# Patient Record
Sex: FEMALE | URBAN - METROPOLITAN AREA
[De-identification: names, ages, dates, MRNs, and addresses within clinical notes are randomized per-mention and may not be internally consistent; named-entity substitution may affect disease eponyms.]

---

## 2020-06-29 ENCOUNTER — OFFICE VISIT (OUTPATIENT)
Dept: URBAN - METROPOLITAN AREA CLINIC 113 | Facility: CLINIC | Age: 80
End: 2020-06-29

## 2020-07-25 ENCOUNTER — TELEPHONE ENCOUNTER (OUTPATIENT)
Dept: URBAN - METROPOLITAN AREA CLINIC 13 | Facility: CLINIC | Age: 80
End: 2020-07-25

## 2020-07-25 RX ORDER — PANTOPRAZOLE SODIUM 40 MG/1
TAKE ONE TABLET EVERY MORNING TABLET, DELAYED RELEASE ORAL
Qty: 90 | Refills: 3 | OUTPATIENT
Start: 2019-06-11 | End: 2020-05-27

## 2020-07-25 RX ORDER — SODIUM SULFATE, POTASSIUM SULFATE, MAGNESIUM SULFATE 17.5; 3.13; 1.6 G/ML; G/ML; G/ML
TAKE 1 BOTTLE AT 5:00PM THE DAY BEFORE PROCEDURE. TAKE 2ND BOTTLE 6 HRS PRIOR TO PROCEDURE SOLUTION, CONCENTRATE ORAL
Qty: 1 | Refills: 0 | OUTPATIENT
Start: 2019-08-14 | End: 2019-10-03

## 2020-07-25 RX ORDER — ONDANSETRON HYDROCHLORIDE 4 MG/1
TAKE 2 TABLET EVERY 6 HOURS TABLET, FILM COATED ORAL
Refills: 0 | OUTPATIENT
End: 2020-05-27

## 2020-07-25 RX ORDER — ALBUTEROL SULFATE 90 UG/1
INHALE 1 PUFF EVERY 4 HOURS AS NEEDED AEROSOL, METERED RESPIRATORY (INHALATION)
Refills: 0 | OUTPATIENT
Start: 2019-02-13 | End: 2020-05-26

## 2020-07-25 RX ORDER — PREGABALIN 75 MG/1
TAKE 1 CAPSULE TWICE DAILY CAPSULE ORAL
Refills: 0 | OUTPATIENT
Start: 2020-03-03 | End: 2020-05-27

## 2020-07-25 RX ORDER — FLUCONAZOLE 100 MG/1
TAKE ONE TABLET BY MOUTH DAILY TABLET ORAL
Qty: 7 | Refills: 0 | OUTPATIENT
Start: 2019-06-07 | End: 2019-10-25

## 2020-07-25 RX ORDER — OMEPRAZOLE 40 MG/1
TAKE 1 CAPSULE BY MOUTH TWICE DAILY CAPSULE, DELAYED RELEASE ORAL
Qty: 20 | Refills: 0 | OUTPATIENT
Start: 2019-06-18 | End: 2019-10-03

## 2020-07-25 RX ORDER — DIAZEPAM 5 MG/1
TAKE 1 TABLET  AS NEEDED TABLET ORAL
Refills: 0 | OUTPATIENT
Start: 2020-05-15 | End: 2020-05-27

## 2020-07-25 RX ORDER — BUDESONIDE AND FORMOTEROL FUMARATE DIHYDRATE 160; 4.5 UG/1; UG/1
INHALE 2 PUFFS TWICE DAILY. RINSE MOUTH AFTER USE PRN AEROSOL RESPIRATORY (INHALATION)
Refills: 0 | OUTPATIENT
Start: 2019-02-13 | End: 2020-05-26

## 2020-07-25 RX ORDER — OMEPRAZOLE 40 MG/1
TAKE 1 CAPSULE BY MOUTH ONCE DAILY CAPSULE, DELAYED RELEASE ORAL
Qty: 30 | Refills: 5 | OUTPATIENT
Start: 2019-10-25 | End: 2020-05-26

## 2020-07-25 RX ORDER — BISMUTH SUBCITRATE POTASSIUM, METRONIDAZOLE, TETRACYCLINE HYDROCHLORIDE 140; 125; 125 MG/1; MG/1; MG/1
TAKE 3 CAPSULE 4 TIMES DAILY CAPSULE ORAL
Qty: 120 | Refills: 0 | OUTPATIENT
Start: 2019-06-18 | End: 2019-07-05

## 2020-07-25 RX ORDER — ATORVASTATIN CALCIUM 10 MG/1
TAKE 1 TABLET DAILY TABLET, FILM COATED ORAL
Refills: 0 | OUTPATIENT
Start: 2019-12-18 | End: 2020-05-27

## 2020-07-25 RX ORDER — SUCRALFATE 1 G/10ML
TAKE 10 ML THREE TIMES A DAY SUSPENSION ORAL
Qty: 450 | Refills: 0 | OUTPATIENT
Start: 2019-07-11 | End: 2019-10-25

## 2020-07-25 RX ORDER — BISMUTH SUBCITRATE POTASSIUM, METRONIDAZOLE, TETRACYCLINE HYDROCHLORIDE 140; 125; 125 MG/1; MG/1; MG/1
TAKE 3 CAPSULE 4 TIMES DAILY CAPSULE ORAL
Qty: 168 | Refills: 0 | OUTPATIENT
Start: 2019-07-05 | End: 2019-10-03

## 2020-07-26 ENCOUNTER — TELEPHONE ENCOUNTER (OUTPATIENT)
Dept: URBAN - METROPOLITAN AREA CLINIC 13 | Facility: CLINIC | Age: 80
End: 2020-07-26

## 2020-07-26 RX ORDER — OXYCODONE AND ACETAMINOPHEN 10; 325 MG/1; MG/1
TAKE 1 TABLET EVERY 6 HOURS AS NEEDED TABLET ORAL
Refills: 0 | Status: ACTIVE | COMMUNITY
Start: 2020-03-03

## 2020-07-26 RX ORDER — OFLOXACIN 3 MG/ML
SOLUTION AURICULAR (OTIC)
Qty: 5 | Refills: 0 | Status: ACTIVE | COMMUNITY
Start: 2020-06-02

## 2020-07-26 RX ORDER — PREDNISONE 10 MG/1
TABLET ORAL
Qty: 66 | Refills: 0 | Status: ACTIVE | COMMUNITY
Start: 2019-02-27

## 2020-07-26 RX ORDER — HYDROCODONE BITARTRATE AND ACETAMINOPHEN 10; 325 MG/1; MG/1
TABLET ORAL
Qty: 42 | Refills: 0 | Status: ACTIVE | COMMUNITY
Start: 2019-04-05

## 2020-07-26 RX ORDER — AMOXICILLIN AND CLAVULANATE POTASSIUM 500; 125 MG/1; MG/1
TABLET, FILM COATED ORAL
Qty: 20 | Refills: 0 | Status: ACTIVE | COMMUNITY
Start: 2018-06-14

## 2020-07-26 RX ORDER — SUCRALFATE 1 G/10ML
SUSPENSION ORAL
Qty: 860 | Refills: 0 | Status: ACTIVE | COMMUNITY
Start: 2019-06-11

## 2020-07-26 RX ORDER — PREGABALIN 75 MG/1
CAPSULE ORAL
Qty: 360 | Refills: 0 | Status: ACTIVE | COMMUNITY
Start: 2019-10-09

## 2020-07-26 RX ORDER — HYDROXYCHLOROQUINE SULFATE 200 MG/1
TABLET, FILM COATED ORAL
Qty: 45 | Refills: 0 | Status: ACTIVE | COMMUNITY
Start: 2018-08-06

## 2020-07-26 RX ORDER — ALBUTEROL SULFATE 90 UG/1
AEROSOL, METERED RESPIRATORY (INHALATION)
Qty: 54 | Refills: 0 | Status: ACTIVE | COMMUNITY
Start: 2019-02-13

## 2020-07-26 RX ORDER — PANTOPRAZOLE SODIUM 40 MG/1
TABLET, DELAYED RELEASE ORAL
Qty: 15 | Refills: 0 | Status: ACTIVE | COMMUNITY
Start: 2018-10-18

## 2020-07-26 RX ORDER — HYDROCODONE BITARTRATE AND ACETAMINOPHEN 5; 325 MG/1; MG/1
TABLET ORAL
Qty: 88 | Refills: 0 | Status: ACTIVE | COMMUNITY
Start: 2019-04-17

## 2020-07-26 RX ORDER — OXYCODONE AND ACETAMINOPHEN 10; 325 MG/1; MG/1
TABLET ORAL
Qty: 90 | Refills: 0 | Status: ACTIVE | COMMUNITY
Start: 2020-01-17

## 2020-07-26 RX ORDER — FLUTICASONE PROPIONATE AND SALMETEROL 250; 50 UG/1; UG/1
POWDER RESPIRATORY (INHALATION)
Qty: 60 | Refills: 0 | Status: ACTIVE | COMMUNITY
Start: 2019-02-14

## 2020-07-26 RX ORDER — METHOCARBAMOL 500 MG/1
TABLET, FILM COATED ORAL
Qty: 56 | Refills: 0 | Status: ACTIVE | COMMUNITY
Start: 2020-06-22

## 2020-07-26 RX ORDER — FLUCONAZOLE 150 MG/1
TABLET ORAL
Qty: 14 | Refills: 0 | Status: ACTIVE | COMMUNITY
Start: 2019-06-11

## 2020-07-26 RX ORDER — PREDNISOLONE ACETATE 10 MG/ML
SUSPENSION/ DROPS OPHTHALMIC
Qty: 5 | Refills: 0 | Status: ACTIVE | COMMUNITY
Start: 2020-06-02

## 2020-07-26 RX ORDER — OXYCODONE AND ACETAMINOPHEN 5; 325 MG/1; MG/1
TABLET ORAL
Qty: 15 | Refills: 0 | Status: ACTIVE | COMMUNITY
Start: 2019-04-16

## 2020-07-26 RX ORDER — METHYLPREDNISOLONE 4 MG/1
TABLET ORAL
Qty: 21 | Refills: 0 | Status: ACTIVE | COMMUNITY
Start: 2019-02-01

## 2020-07-26 RX ORDER — AZITHROMYCIN DIHYDRATE 250 MG/1
TABLET, FILM COATED ORAL
Qty: 6 | Refills: 0 | Status: ACTIVE | COMMUNITY
Start: 2019-05-09

## 2020-07-26 RX ORDER — ONDANSETRON HYDROCHLORIDE 4 MG/1
TABLET, FILM COATED ORAL
Qty: 12 | Refills: 0 | Status: ACTIVE | COMMUNITY
Start: 2018-10-18

## 2020-07-26 RX ORDER — OXYCODONE AND ACETAMINOPHEN 10; 325 MG/1; MG/1
TABLET ORAL
Qty: 2 | Refills: 0 | Status: ACTIVE | COMMUNITY
Start: 2018-11-07

## 2020-07-26 RX ORDER — ATORVASTATIN CALCIUM 10 MG/1
TABLET, FILM COATED ORAL
Qty: 90 | Refills: 0 | Status: ACTIVE | COMMUNITY
Start: 2019-10-02

## 2020-07-26 RX ORDER — PROMETHAZINE HYDROCHLORIDE 25 MG/1
TABLET ORAL
Qty: 30 | Refills: 0 | Status: ACTIVE | COMMUNITY
Start: 2019-06-19

## 2020-07-26 RX ORDER — IBUPROFEN 800 MG/1
TABLET ORAL
Qty: 90 | Refills: 0 | Status: ACTIVE | COMMUNITY
Start: 2018-12-12

## 2020-07-26 RX ORDER — METHYLPREDNISOLONE 4 MG/1
TABLET ORAL
Qty: 21 | Refills: 0 | Status: ACTIVE | COMMUNITY
Start: 2019-01-08

## 2020-07-26 RX ORDER — PANTOPRAZOLE SODIUM 40 MG/1
TABLET, DELAYED RELEASE ORAL
Qty: 30 | Refills: 0 | Status: ACTIVE | COMMUNITY
Start: 2019-06-11

## 2020-07-26 RX ORDER — METHYLPREDNISOLONE 4 MG/1
TABLET ORAL
Qty: 21 | Refills: 0 | Status: ACTIVE | COMMUNITY
Start: 2018-06-14

## 2020-07-26 RX ORDER — ONDANSETRON HYDROCHLORIDE 4 MG/1
TABLET, FILM COATED ORAL
Qty: 20 | Refills: 0 | Status: ACTIVE | COMMUNITY
Start: 2019-04-16

## 2020-07-26 RX ORDER — ALBUTEROL SULFATE 90 UG/1
AEROSOL, METERED RESPIRATORY (INHALATION)
Qty: 18 | Refills: 0 | Status: ACTIVE | COMMUNITY
Start: 2019-02-13

## 2020-07-26 RX ORDER — HYDROXYCHLOROQUINE SULFATE 200 MG/1
TABLET, FILM COATED ORAL
Qty: 135 | Refills: 0 | Status: ACTIVE | COMMUNITY
Start: 2020-01-09

## 2020-07-26 RX ORDER — DIAZEPAM 10 MG/1
TABLET ORAL
Qty: 2 | Refills: 0 | Status: ACTIVE | COMMUNITY
Start: 2018-11-07

## 2020-07-26 RX ORDER — HYDROCODONE BITARTRATE AND ACETAMINOPHEN 5; 325 MG/1; MG/1
TABLET ORAL
Qty: 60 | Refills: 0 | Status: ACTIVE | COMMUNITY
Start: 2018-09-05

## 2020-07-26 RX ORDER — TRIAMTERENE AND HYDROCHLOROTHIAZIDE 37.5; 25 MG/1; MG/1
CAPSULE ORAL
Qty: 90 | Refills: 0 | Status: ACTIVE | COMMUNITY
Start: 2019-05-09

## 2020-07-26 RX ORDER — AZITHROMYCIN DIHYDRATE 250 MG/1
TABLET, FILM COATED ORAL
Qty: 6 | Refills: 0 | Status: ACTIVE | COMMUNITY
Start: 2019-01-08

## 2020-07-26 RX ORDER — HYDROCODONE BITARTRATE AND ACETAMINOPHEN 5; 325 MG/1; MG/1
TABLET ORAL
Qty: 36 | Refills: 0 | Status: ACTIVE | COMMUNITY
Start: 2019-05-21

## 2020-07-26 RX ORDER — NITROFURANTOIN MONOHYDRATE/MACROCRYSTALLINE 25; 75 MG/1; MG/1
CAPSULE ORAL
Qty: 6 | Refills: 0 | Status: ACTIVE | COMMUNITY
Start: 2019-06-11

## 2020-07-26 RX ORDER — CEPHALEXIN 500 MG/1
CAPSULE ORAL
Qty: 28 | Refills: 0 | Status: ACTIVE | COMMUNITY
Start: 2019-05-30

## 2020-07-26 RX ORDER — OXYCODONE AND ACETAMINOPHEN 5; 325 MG/1; MG/1
TABLET ORAL
Qty: 30 | Refills: 0 | Status: ACTIVE | COMMUNITY
Start: 2019-10-30

## 2020-07-26 RX ORDER — METHOCARBAMOL 500 MG/1
TABLET, FILM COATED ORAL
Qty: 56 | Refills: 0 | Status: ACTIVE | COMMUNITY
Start: 2019-04-05

## 2020-07-26 RX ORDER — PROMETHAZINE HYDROCHLORIDE 12.5 MG/1
TABLET ORAL
Qty: 15 | Refills: 0 | Status: ACTIVE | COMMUNITY
Start: 2019-06-11

## 2020-07-26 RX ORDER — DICLOFENAC SODIUM 10 MG/G
GEL TOPICAL
Qty: 600 | Refills: 0 | Status: ACTIVE | COMMUNITY
Start: 2019-06-25

## 2020-07-26 RX ORDER — HYDROXYCHLOROQUINE SULFATE 200 MG/1
TABLET, FILM COATED ORAL
Qty: 45 | Refills: 0 | Status: ACTIVE | COMMUNITY
Start: 2019-10-02

## 2020-07-26 RX ORDER — CIPROFLOXACIN HYDROCHLORIDE 250 MG/1
TABLET, FILM COATED ORAL
Qty: 10 | Refills: 0 | Status: ACTIVE | COMMUNITY
Start: 2019-02-18

## 2023-02-01 ENCOUNTER — WEB ENCOUNTER (OUTPATIENT)
Dept: URBAN - METROPOLITAN AREA CLINIC 113 | Facility: CLINIC | Age: 83
End: 2023-02-01

## 2023-02-01 ENCOUNTER — OFFICE VISIT (OUTPATIENT)
Dept: URBAN - METROPOLITAN AREA CLINIC 113 | Facility: CLINIC | Age: 83
End: 2023-02-01
Payer: MEDICARE

## 2023-02-01 VITALS
BODY MASS INDEX: 18.33 KG/M2 | HEART RATE: 79 BPM | HEIGHT: 65 IN | WEIGHT: 110 LBS | DIASTOLIC BLOOD PRESSURE: 75 MMHG | TEMPERATURE: 97.5 F | SYSTOLIC BLOOD PRESSURE: 131 MMHG | RESPIRATION RATE: 22 BRPM

## 2023-02-01 DIAGNOSIS — K40.91 UNILATERAL RECURRENT INGUINAL HERNIA WITHOUT OBSTRUCTION OR GANGRENE: ICD-10-CM

## 2023-02-01 DIAGNOSIS — K25.7 CHRONIC GASTRIC ULCER WITHOUT HEMORRHAGE AND WITHOUT PERFORATION: ICD-10-CM

## 2023-02-01 DIAGNOSIS — K22.2 ESOPHAGEAL STENOSIS: ICD-10-CM

## 2023-02-01 DIAGNOSIS — R13.19 ESOPHAGEAL DYSPHAGIA: ICD-10-CM

## 2023-02-01 PROBLEM — 76796008: Status: ACTIVE | Noted: 2023-02-01

## 2023-02-01 PROBLEM — 300286002: Status: ACTIVE | Noted: 2023-02-01

## 2023-02-01 PROCEDURE — 99214 OFFICE O/P EST MOD 30 MIN: CPT | Performed by: INTERNAL MEDICINE

## 2023-02-01 RX ORDER — AZITHROMYCIN DIHYDRATE 250 MG/1
TABLET, FILM COATED ORAL
Qty: 6 | Refills: 0 | Status: DISCONTINUED | COMMUNITY
Start: 2019-01-08

## 2023-02-01 RX ORDER — PREDNISONE 10 MG/1
TABLET ORAL
Qty: 66 | Refills: 0 | Status: DISCONTINUED | COMMUNITY
Start: 2019-02-27

## 2023-02-01 RX ORDER — FLUTICASONE PROPIONATE AND SALMETEROL 250; 50 UG/1; UG/1
POWDER RESPIRATORY (INHALATION)
Qty: 60 | Refills: 0 | Status: DISCONTINUED | COMMUNITY
Start: 2019-02-14

## 2023-02-01 RX ORDER — TRIAMTERENE AND HYDROCHLOROTHIAZIDE 37.5; 25 MG/1; MG/1
CAPSULE ORAL
Qty: 90 | Refills: 0 | Status: DISCONTINUED | COMMUNITY
Start: 2019-05-09

## 2023-02-01 RX ORDER — OFLOXACIN 3 MG/ML
SOLUTION AURICULAR (OTIC)
Qty: 5 | Refills: 0 | Status: DISCONTINUED | COMMUNITY
Start: 2020-06-02

## 2023-02-01 RX ORDER — CIPROFLOXACIN HYDROCHLORIDE 250 MG/1
TABLET, FILM COATED ORAL
Qty: 10 | Refills: 0 | Status: DISCONTINUED | COMMUNITY
Start: 2019-02-18

## 2023-02-01 RX ORDER — OXYCODONE AND ACETAMINOPHEN 10; 325 MG/1; MG/1
TABLET ORAL
Qty: 2 | Refills: 0 | Status: DISCONTINUED | COMMUNITY
Start: 2018-11-07

## 2023-02-01 RX ORDER — METHOCARBAMOL 500 MG/1
TABLET, FILM COATED ORAL
Qty: 56 | Refills: 0 | Status: DISCONTINUED | COMMUNITY
Start: 2019-04-05

## 2023-02-01 RX ORDER — PANTOPRAZOLE SODIUM 40 MG/1
TABLET, DELAYED RELEASE ORAL
Qty: 15 | Refills: 0 | Status: DISCONTINUED | COMMUNITY
Start: 2018-10-18

## 2023-02-01 RX ORDER — ALBUTEROL SULFATE 90 UG/1
AEROSOL, METERED RESPIRATORY (INHALATION)
Qty: 18 | Refills: 0 | Status: DISCONTINUED | COMMUNITY
Start: 2019-02-13

## 2023-02-01 RX ORDER — PREGABALIN 75 MG/1
CAPSULE ORAL
Qty: 360 | Refills: 0 | Status: DISCONTINUED | COMMUNITY
Start: 2019-10-09

## 2023-02-01 RX ORDER — NITROFURANTOIN MONOHYDRATE/MACROCRYSTALLINE 25; 75 MG/1; MG/1
CAPSULE ORAL
Qty: 6 | Refills: 0 | Status: DISCONTINUED | COMMUNITY
Start: 2019-06-11

## 2023-02-01 RX ORDER — ATORVASTATIN CALCIUM 10 MG/1
TABLET, FILM COATED ORAL
Qty: 90 | Refills: 0 | Status: ACTIVE | COMMUNITY
Start: 2019-10-02

## 2023-02-01 RX ORDER — FLUCONAZOLE 150 MG/1
TABLET ORAL
Qty: 14 | Refills: 0 | Status: DISCONTINUED | COMMUNITY
Start: 2019-06-11

## 2023-02-01 RX ORDER — HYDROXYCHLOROQUINE SULFATE 200 MG/1
TABLET, FILM COATED ORAL
Qty: 45 | Refills: 0 | Status: DISCONTINUED | COMMUNITY
Start: 2018-08-06

## 2023-02-01 RX ORDER — PROMETHAZINE HYDROCHLORIDE 12.5 MG/1
TABLET ORAL
Qty: 15 | Refills: 0 | Status: DISCONTINUED | COMMUNITY
Start: 2019-06-11

## 2023-02-01 RX ORDER — HYDROCODONE BITARTRATE AND ACETAMINOPHEN 5; 325 MG/1; MG/1
TABLET ORAL
Qty: 60 | Refills: 0 | Status: DISCONTINUED | COMMUNITY
Start: 2018-09-05

## 2023-02-01 RX ORDER — DIAZEPAM 10 MG/1
TABLET ORAL
Qty: 2 | Refills: 0 | Status: DISCONTINUED | COMMUNITY
Start: 2018-11-07

## 2023-02-01 RX ORDER — ONDANSETRON HYDROCHLORIDE 4 MG/1
TABLET, FILM COATED ORAL
Qty: 12 | Refills: 0 | Status: DISCONTINUED | COMMUNITY
Start: 2018-10-18

## 2023-02-01 RX ORDER — DICLOFENAC SODIUM 10 MG/G
GEL TOPICAL
Qty: 600 | Refills: 0 | Status: DISCONTINUED | COMMUNITY
Start: 2019-06-25

## 2023-02-01 RX ORDER — ALBUTEROL SULFATE 90 UG/1
AEROSOL, METERED RESPIRATORY (INHALATION)
Qty: 54 | Refills: 0 | Status: DISCONTINUED | COMMUNITY
Start: 2019-02-13

## 2023-02-01 RX ORDER — CEPHALEXIN 500 MG/1
CAPSULE ORAL
Qty: 28 | Refills: 0 | Status: DISCONTINUED | COMMUNITY
Start: 2019-05-30

## 2023-02-01 RX ORDER — AMOXICILLIN AND CLAVULANATE POTASSIUM 500; 125 MG/1; MG/1
TABLET, FILM COATED ORAL
Qty: 20 | Refills: 0 | Status: DISCONTINUED | COMMUNITY
Start: 2018-06-14

## 2023-02-01 RX ORDER — IBUPROFEN 800 MG/1
TABLET ORAL
Qty: 90 | Refills: 0 | Status: DISCONTINUED | COMMUNITY
Start: 2018-12-12

## 2023-02-01 RX ORDER — PREDNISOLONE ACETATE 10 MG/ML
SUSPENSION/ DROPS OPHTHALMIC
Qty: 5 | Refills: 0 | Status: DISCONTINUED | COMMUNITY
Start: 2020-06-02

## 2023-02-01 RX ORDER — OXYCODONE AND ACETAMINOPHEN 5; 325 MG/1; MG/1
TABLET ORAL
Qty: 15 | Refills: 0 | Status: DISCONTINUED | COMMUNITY
Start: 2019-04-16

## 2023-02-01 RX ORDER — SUCRALFATE 1 G/10ML
SUSPENSION ORAL
Qty: 860 | Refills: 0 | Status: DISCONTINUED | COMMUNITY
Start: 2019-06-11

## 2023-02-01 RX ORDER — PROMETHAZINE HYDROCHLORIDE 25 MG/1
TABLET ORAL
Qty: 30 | Refills: 0 | Status: DISCONTINUED | COMMUNITY
Start: 2019-06-19

## 2023-02-01 RX ORDER — HYDROCODONE BITARTRATE AND ACETAMINOPHEN 10; 325 MG/1; MG/1
TABLET ORAL
Qty: 42 | Refills: 0 | Status: DISCONTINUED | COMMUNITY
Start: 2019-04-05

## 2023-02-01 RX ORDER — METHYLPREDNISOLONE 4 MG/1
TABLET ORAL
Qty: 21 | Refills: 0 | Status: DISCONTINUED | COMMUNITY
Start: 2018-06-14

## 2023-02-01 NOTE — HPI-TODAY'S VISIT:
81 y/o female with a hx of esophageal dysphagia and esophageal dilation here with dysphagia and abdominal pain. She last had an EGD in ay 2020. She was dilated up to 20mm. She also had gastric ulcers x 3.   She has had decreased appetite, food sticking, nausea, and 30 lb weight loss over the past year.

## 2023-03-31 ENCOUNTER — CLAIMS CREATED FROM THE CLAIM WINDOW (OUTPATIENT)
Dept: URBAN - METROPOLITAN AREA CLINIC 4 | Facility: CLINIC | Age: 83
End: 2023-03-31
Payer: MEDICARE

## 2023-03-31 ENCOUNTER — OFFICE VISIT (OUTPATIENT)
Dept: URBAN - METROPOLITAN AREA SURGERY CENTER 25 | Facility: SURGERY CENTER | Age: 83
End: 2023-03-31
Payer: MEDICARE

## 2023-03-31 DIAGNOSIS — K29.70 GASTRITIS, UNSPECIFIED, WITHOUT BLEEDING: ICD-10-CM

## 2023-03-31 DIAGNOSIS — K31.89 REACTIVE GASTROPATHY: ICD-10-CM

## 2023-03-31 DIAGNOSIS — R13.10 DYSPHAGIA: ICD-10-CM

## 2023-03-31 PROCEDURE — 88312 SPECIAL STAINS GROUP 1: CPT | Performed by: PATHOLOGY

## 2023-03-31 PROCEDURE — 88305 TISSUE EXAM BY PATHOLOGIST: CPT | Performed by: PATHOLOGY

## 2023-03-31 PROCEDURE — G8907 PT DOC NO EVENTS ON DISCHARG: HCPCS | Performed by: INTERNAL MEDICINE

## 2023-03-31 PROCEDURE — 43248 EGD GUIDE WIRE INSERTION: CPT | Performed by: INTERNAL MEDICINE

## 2023-03-31 PROCEDURE — 43239 EGD BIOPSY SINGLE/MULTIPLE: CPT | Performed by: INTERNAL MEDICINE

## 2023-03-31 RX ORDER — ATORVASTATIN CALCIUM 10 MG/1
TABLET, FILM COATED ORAL
Qty: 90 | Refills: 0 | Status: ACTIVE | COMMUNITY
Start: 2019-10-02

## 2023-04-13 ENCOUNTER — OFFICE VISIT (OUTPATIENT)
Dept: URBAN - METROPOLITAN AREA CLINIC 113 | Facility: CLINIC | Age: 83
End: 2023-04-13
Payer: MEDICARE

## 2023-04-13 VITALS
HEART RATE: 66 BPM | WEIGHT: 109.6 LBS | HEIGHT: 65 IN | DIASTOLIC BLOOD PRESSURE: 60 MMHG | SYSTOLIC BLOOD PRESSURE: 132 MMHG | BODY MASS INDEX: 18.26 KG/M2 | TEMPERATURE: 97.5 F | RESPIRATION RATE: 18 BRPM

## 2023-04-13 DIAGNOSIS — R11.0 NAUSEA: ICD-10-CM

## 2023-04-13 DIAGNOSIS — R63.0 DECREASED APPETITE: ICD-10-CM

## 2023-04-13 DIAGNOSIS — K76.89 LIVER CYST: ICD-10-CM

## 2023-04-13 DIAGNOSIS — K25.7 CHRONIC GASTRIC ULCER WITHOUT HEMORRHAGE AND WITHOUT PERFORATION: ICD-10-CM

## 2023-04-13 DIAGNOSIS — K22.2 ESOPHAGEAL STENOSIS: ICD-10-CM

## 2023-04-13 DIAGNOSIS — R13.12 OROPHARYNGEAL DYSPHAGIA: ICD-10-CM

## 2023-04-13 DIAGNOSIS — Z86.010 HISTORY OF COLON POLYPS: ICD-10-CM

## 2023-04-13 DIAGNOSIS — K40.91 UNILATERAL RECURRENT INGUINAL HERNIA WITHOUT OBSTRUCTION OR GANGRENE: ICD-10-CM

## 2023-04-13 DIAGNOSIS — R63.4 WEIGHT LOSS: ICD-10-CM

## 2023-04-13 PROBLEM — 71457002: Status: ACTIVE | Noted: 2023-04-13

## 2023-04-13 PROBLEM — 428283002: Status: ACTIVE | Noted: 2023-04-13

## 2023-04-13 PROBLEM — 64379006: Status: ACTIVE | Noted: 2023-04-13

## 2023-04-13 PROBLEM — 85057007: Status: ACTIVE | Noted: 2023-04-13

## 2023-04-13 PROCEDURE — 99214 OFFICE O/P EST MOD 30 MIN: CPT

## 2023-04-13 RX ORDER — ONDANSETRON 4 MG/1
1 TABLET ON THE TONGUE AND ALLOW TO DISSOLVE TABLET, ORALLY DISINTEGRATING ORAL
Qty: 40 | Refills: 0 | OUTPATIENT
Start: 2023-04-13

## 2023-04-13 RX ORDER — ATORVASTATIN CALCIUM 10 MG/1
TABLET, FILM COATED ORAL
Qty: 90 | Refills: 0 | Status: ON HOLD | COMMUNITY
Start: 2019-10-02

## 2023-04-13 RX ORDER — OXYCODONE AND ACETAMINOPHEN 5; 325 MG/1; MG/1
1 TABLET AS NEEDED TABLET ORAL
Refills: 0 | Status: ACTIVE | COMMUNITY
Start: 2019-04-16

## 2023-04-13 NOTE — HPI-TODAY'S VISIT:
82-year-old female presents for follow-up after EGD.  She was last seen on 2/1/2023.  Given her dysphagia she was planned for EGD with dilation if indicated.  She has a history of unilateral recurrent inguinal hernia.  She was planned for CT scan of the abdomen/pelvis for further evaluation. CT scan of the abdomen/pelvis with contrast 3/8/2023:No discrete recurrent inguinal hernia is seen.  There is an 18 mm left hepatic dome cyst, similar to prior study.  Spleen, adrenal glands, pancreas are normal.  Cholecystectomy. EGD 3/31/2023:Performed without difficulty.  No endoscopic abnormality was evident in the esophagus to explain patient's dysphagia.  Despite this the entire esophagus was dilated with a Savary dilator to 18 mm.  Gastritis noted and biopsied. This revealed chemical/reactive gastropathy, negative for H pylori. Normal duodenum.  Repeat EGD as needed.   She has had memory issues of late. her  has to aid in providing a history. She describes an oropharyngeal dysphagia and globus sensation in her upper esophgus. She has occasional nausea with vomiting. She has an anti-emetic at home that works well for this. She states "she never has an appetite." She was unaware of any prior liver cyst. She feels a bulge in her left inguinal area. This only bothers her when doing heavy lifting.  2/1/2023 83 y/o female with a hx of esophageal dysphagia and esophageal dilation here with dysphagia and abdominal pain. She last had an EGD in ay 2020. She was dilated up to 20mm. She also had gastric ulcers x 3.   She has had decreased appetite, food sticking, nausea, and 30 lb weight loss over the past year.

## 2023-06-14 ENCOUNTER — OFFICE VISIT (OUTPATIENT)
Dept: URBAN - METROPOLITAN AREA CLINIC 113 | Facility: CLINIC | Age: 83
End: 2023-06-14

## 2023-06-14 NOTE — HPI-TODAY'S VISIT:
82-year-old female presents for follow-up.  She was last seen on 4/13/2023.  She has had oropharyngeal dysphagia and globus sensation with a history of esophageal stricture.  EGD revealed no esophageal abnormality however the entire esophagus was dilated.  She had mild improvement in her swallowing.  Barium swallow in 2019 did reveal mild dysmotility and suspected this may be her issue.  We discussed repeating the barium swallow though she declined.  She was recommended a trial of over-the-counter Pepcid for 14 days.  She was provided Zofran for her chronic intermittent nausea.  She did have evidence of the liver cyst on CT imaging.  This is likely benign however given lack of prior imaging to compare and ongoing weight loss she is planning for MRI of the abdomen for further evaluation.  We do not have MRI for review.  4/13/2023 82-year-old female presents for follow-up after EGD.  She was last seen on 2/1/2023.  Given her dysphagia she was planned for EGD with dilation if indicated.  She has a history of unilateral recurrent inguinal hernia.  She was planned for CT scan of the abdomen/pelvis for further evaluation. CT scan of the abdomen/pelvis with contrast 3/8/2023:No discrete recurrent inguinal hernia is seen.  There is an 18 mm left hepatic dome cyst, similar to prior study.  Spleen, adrenal glands, pancreas are normal.  Cholecystectomy. EGD 3/31/2023:Performed without difficulty.  No endoscopic abnormality was evident in the esophagus to explain patient's dysphagia.  Despite this the entire esophagus was dilated with a Savary dilator to 18 mm.  Gastritis noted and biopsied. This revealed chemical/reactive gastropathy, negative for H pylori. Normal duodenum.  Repeat EGD as needed.   She has had memory issues of late. her  has to aid in providing a history. She describes an oropharyngeal dysphagia and globus sensation in her upper esophgus. She has occasional nausea with vomiting. She has an anti-emetic at home that works well for this. She states "she never has an appetite." She was unaware of any prior liver cyst. She feels a bulge in her left inguinal area. This only bothers her when doing heavy lifting.  2/1/2023 81 y/o female with a hx of esophageal dysphagia and esophageal dilation here with dysphagia and abdominal pain. She last had an EGD in ay 2020. She was dilated up to 20mm. She also had gastric ulcers x 3.   She has had decreased appetite, food sticking, nausea, and 30 lb weight loss over the past year.

## 2023-06-16 ENCOUNTER — TELEPHONE ENCOUNTER (OUTPATIENT)
Dept: URBAN - METROPOLITAN AREA CLINIC 113 | Facility: CLINIC | Age: 83
End: 2023-06-16

## 2023-06-16 RX ORDER — ONDANSETRON 4 MG/1
1 TABLET ON THE TONGUE AND ALLOW TO DISSOLVE TABLET, ORALLY DISINTEGRATING ORAL
Qty: 90 | Refills: 0 | OUTPATIENT
Start: 2023-06-16

## 2023-08-09 ENCOUNTER — OFFICE VISIT (OUTPATIENT)
Dept: URBAN - METROPOLITAN AREA CLINIC 113 | Facility: CLINIC | Age: 83
End: 2023-08-09
Payer: MEDICARE

## 2023-08-09 ENCOUNTER — DASHBOARD ENCOUNTERS (OUTPATIENT)
Age: 83
End: 2023-08-09

## 2023-08-09 VITALS
DIASTOLIC BLOOD PRESSURE: 71 MMHG | BODY MASS INDEX: 18.69 KG/M2 | WEIGHT: 112.2 LBS | HEART RATE: 80 BPM | RESPIRATION RATE: 18 BRPM | HEIGHT: 65 IN | TEMPERATURE: 97.3 F | SYSTOLIC BLOOD PRESSURE: 131 MMHG

## 2023-08-09 DIAGNOSIS — K22.89 PRESBYESOPHAGUS: ICD-10-CM

## 2023-08-09 DIAGNOSIS — R63.0 DECREASED APPETITE: ICD-10-CM

## 2023-08-09 DIAGNOSIS — K25.7 CHRONIC GASTRIC ULCER WITHOUT HEMORRHAGE AND WITHOUT PERFORATION: ICD-10-CM

## 2023-08-09 DIAGNOSIS — K22.2 ESOPHAGEAL STENOSIS: ICD-10-CM

## 2023-08-09 DIAGNOSIS — Z86.010 HISTORY OF COLON POLYPS: ICD-10-CM

## 2023-08-09 DIAGNOSIS — K40.91 UNILATERAL RECURRENT INGUINAL HERNIA WITHOUT OBSTRUCTION OR GANGRENE: ICD-10-CM

## 2023-08-09 DIAGNOSIS — R63.4 WEIGHT LOSS: ICD-10-CM

## 2023-08-09 DIAGNOSIS — R13.12 OROPHARYNGEAL DYSPHAGIA: ICD-10-CM

## 2023-08-09 DIAGNOSIS — K76.89 LIVER CYST: ICD-10-CM

## 2023-08-09 DIAGNOSIS — R11.0 NAUSEA: ICD-10-CM

## 2023-08-09 PROCEDURE — 99214 OFFICE O/P EST MOD 30 MIN: CPT

## 2023-08-09 RX ORDER — ONDANSETRON 4 MG/1
1 TABLET ON THE TONGUE AND ALLOW TO DISSOLVE TABLET, ORALLY DISINTEGRATING ORAL
Qty: 40 | Refills: 0 | OUTPATIENT

## 2023-08-09 RX ORDER — MIRTAZAPINE 15 MG/1
1 TABLET AT BEDTIME TABLET, FILM COATED ORAL ONCE A DAY
Qty: 90 TABLET | Refills: 1 | OUTPATIENT
Start: 2023-08-09

## 2023-08-09 RX ORDER — OXYCODONE AND ACETAMINOPHEN 5; 325 MG/1; MG/1
1 TABLET AS NEEDED TABLET ORAL
Refills: 0 | Status: ACTIVE | COMMUNITY
Start: 2019-04-16

## 2023-08-09 RX ORDER — ATORVASTATIN CALCIUM 10 MG/1
TABLET, FILM COATED ORAL
Qty: 90 | Refills: 0 | Status: ON HOLD | COMMUNITY
Start: 2019-10-02

## 2023-08-09 RX ORDER — ONDANSETRON 4 MG/1
1 TABLET ON THE TONGUE AND ALLOW TO DISSOLVE TABLET, ORALLY DISINTEGRATING ORAL
Qty: 90 | Refills: 0 | Status: ACTIVE | COMMUNITY
Start: 2023-06-16

## 2023-08-09 RX ORDER — OMEPRAZOLE 20 MG/1
1 CAPSULE 30 MINUTES BEFORE MORNING MEAL CAPSULE, DELAYED RELEASE ORAL ONCE A DAY
Qty: 90 | Refills: 3 | OUTPATIENT
Start: 2023-08-09

## 2023-08-09 RX ORDER — ONDANSETRON 4 MG/1
1 TABLET ON THE TONGUE AND ALLOW TO DISSOLVE TABLET, ORALLY DISINTEGRATING ORAL
Qty: 40 | Refills: 0 | Status: ON HOLD | COMMUNITY
Start: 2023-04-13

## 2023-08-09 NOTE — HPI-TODAY'S VISIT:
82-year-old female presents for follow-up.  She was last seen on 4/13/2023.  She has had oropharyngeal dysphagia and globus sensation with a history of esophageal stricture.  EGD revealed no esophageal abnormality however the entire esophagus was dilated.  She had mild improvement in her swallowing.  Barium swallow in 2019 did reveal mild dysmotility and suspected this maybe her issue.  We discussed repeating the barium swallow though she declined.  She was recommended a trial of over-the-counter Pepcid for 14 days.  She was provided Zofran for her chronic intermittent nausea.  She did have evidence of the liver cyst on CT imaging.  This is likely benign however given lack of prior imaging to compare and ongoing weight loss she is planning for MRI of the abdomen for further evaluation.    MRI of the abdomen with and without contrast/28/23:Liver is normal with normal background signal.  Nonenhancing 2 cm cyst within left hepatic lobe, similar to prior.  No suspicious osseous lesions.  Postoperative changes of the lumbar spine and left pelvis.  Associated 4.8 x 1.3 x 7.2 cm nonenhancing postoperative fluid collection potentially representing chronic seroma or hematoma.  Bilateral renal cyst.  was informed of results. Barium swallow 6/27/2023:Tablet delayed at the GE junction and passed into the stomach.  There are tertiary contractions in the esophagus consistent with presbyesophagus..  No mucosal thickening or esophageal stricture.  Small hiatal hernia.  No gastroesophageal reflux. Upper GI series performed for epigastric pain with nausea 7/11/2023:We do not have report.    She was recommended to see Dr. Wu for her back pain. She has not hear back from their office. She continues to have dysphagia, nausea and throat clearing. She is on chronic opioids for leg pain. She has no appetite. She eats maybe one meal per day. Her weight has interestingly improved by 3 pounds.  4/13/2023 82-year-old female presents for follow-up after EGD.  She was last seen on 2/1/2023.  Given her dysphagia she was planned for EGD with dilation if indicated.  She has a history of unilateral recurrent inguinal hernia.  She was planned for CT scan of the abdomen/pelvis for further evaluation. CT scan of the abdomen/pelvis with contrast 3/8/2023:No discrete recurrent inguinal hernia is seen.  There is an 18 mm left hepatic dome cyst, similar to prior study.  Spleen, adrenal glands, pancreas are normal.  Cholecystectomy. EGD 3/31/2023:Performed without difficulty.  No endoscopic abnormality was evident in the esophagus to explain patient's dysphagia.  Despite this the entire esophagus was dilated with a Savary dilator to 18 mm.  Gastritis noted and biopsied. This revealed chemical/reactive gastropathy, negative for H pylori. Normal duodenum.  Repeat EGD as needed.   She has had memory issues of late. Her  has to aid in providing a history. She describes an oropharyngeal dysphagia and globus sensation in her upper esophagus. She has occasional nausea with vomiting. She has an anti-emetic at home that works well for this. She states "she never has an appetite." She was unaware of any prior liver cyst. She feels a bulge in her left inguinal area. This only bothers her when doing heavy lifting.  2/1/2023 83 y/o female with a hx of esophageal dysphagia and esophageal dilation here with dysphagia and abdominal pain. She last had an EGD in ay 2020. She was dilated up to 20mm. She also had gastric ulcers x 3.   She has had decreased appetite, food sticking, nausea, and 30 lb weight loss over the past year.

## 2023-08-10 LAB
A/G RATIO: 1.9
ABSOLUTE BASOPHILS: 32
ABSOLUTE EOSINOPHILS: 465
ABSOLUTE LYMPHOCYTES: 1808
ABSOLUTE MONOCYTES: 524
ABSOLUTE NEUTROPHILS: 1771
ALBUMIN: 4.1
ALKALINE PHOSPHATASE: 95
ALT (SGPT): 6
AST (SGOT): 15
BASOPHILS: 0.7
BILIRUBIN, TOTAL: 0.4
BUN/CREATININE RATIO: (no result)
BUN: 18
C-REACTIVE PROTEIN, QUANT: 0.6
CALCIUM: 9.6
CARBON DIOXIDE, TOTAL: 29
CHLORIDE: 102
CREATININE: 0.7
EGFR: 85
EOSINOPHILS: 10.1
GLOBULIN, TOTAL: 2.2
GLUCOSE: 103
HEMATOCRIT: 40.5
HEMOGLOBIN: 13.7
LYMPHOCYTES: 39.3
MCH: 34.1
MCHC: 33.8
MCV: 100.7
MONOCYTES: 11.4
MPV: 10.3
NEUTROPHILS: 38.5
PLATELET COUNT: 258
POTASSIUM: 4.3
PROTEIN, TOTAL: 6.3
RDW: 11.7
RED BLOOD CELL COUNT: 4.02
SED RATE BY MODIFIED: 2
SODIUM: 141
TSH W/REFLEX TO FT4: 2.23
WHITE BLOOD CELL COUNT: 4.6

## 2023-08-11 ENCOUNTER — TELEPHONE ENCOUNTER (OUTPATIENT)
Dept: URBAN - METROPOLITAN AREA CLINIC 113 | Facility: CLINIC | Age: 83
End: 2023-08-11

## 2023-11-09 ENCOUNTER — OFFICE VISIT (OUTPATIENT)
Dept: URBAN - METROPOLITAN AREA CLINIC 113 | Facility: CLINIC | Age: 83
End: 2023-11-09

## 2023-11-09 RX ORDER — ONDANSETRON 4 MG/1
1 TABLET ON THE TONGUE AND ALLOW TO DISSOLVE TABLET, ORALLY DISINTEGRATING ORAL
Qty: 40 | Refills: 0 | Status: ACTIVE | COMMUNITY

## 2023-11-09 RX ORDER — MIRTAZAPINE 15 MG/1
1 TABLET AT BEDTIME TABLET, FILM COATED ORAL ONCE A DAY
Qty: 90 TABLET | Refills: 1 | Status: ACTIVE | COMMUNITY
Start: 2023-08-09

## 2023-11-09 RX ORDER — OMEPRAZOLE 20 MG/1
1 CAPSULE 30 MINUTES BEFORE MORNING MEAL CAPSULE, DELAYED RELEASE ORAL ONCE A DAY
Qty: 90 | Refills: 3 | Status: ACTIVE | COMMUNITY
Start: 2023-08-09

## 2023-11-09 RX ORDER — ONDANSETRON 4 MG/1
1 TABLET ON THE TONGUE AND ALLOW TO DISSOLVE TABLET, ORALLY DISINTEGRATING ORAL
Qty: 90 | Refills: 0 | Status: ACTIVE | COMMUNITY
Start: 2023-06-16

## 2023-11-09 RX ORDER — ATORVASTATIN CALCIUM 10 MG/1
TABLET, FILM COATED ORAL
Qty: 90 | Refills: 0 | Status: ON HOLD | COMMUNITY
Start: 2019-10-02

## 2023-11-09 RX ORDER — OXYCODONE AND ACETAMINOPHEN 5; 325 MG/1; MG/1
1 TABLET AS NEEDED TABLET ORAL
Refills: 0 | Status: ACTIVE | COMMUNITY
Start: 2019-04-16

## 2023-11-09 NOTE — HPI-TODAY'S VISIT:
83-year-old female presents for follow-up.  She was last seen on 8/9/2023.  Swallowing precautions reviewed.  She was started on low-dose PPI therapy in the interim to treat any underlying reflux that may be contributing to her throat clearing.  Regarding her continued weight loss, additional labs were obtained and she was to try Remeron for appetite stimulants.  Recommended Ensure shakes 1-2 times daily for increased caloric intake. Labs 8/9/2023:Normal sed rate, normal CRP, normal TSH, unremarkable CMP, unremarkable CBC. A gastric emptying scan was ordered in August and she was again advised to start her Remeron. We do not have the scan for review. 8/9/2023: 82-year-old female presents for follow-up.  She was last seen on 4/13/2023.  She has had oropharyngeal dysphagia and globus sensation with a history of esophageal stricture.  EGD revealed no esophageal abnormality however the entire esophagus was dilated.  She had mild improvement in her swallowing.  Barium swallow in 2019 did reveal mild dysmotility and suspected this maybe her issue.  We discussed repeating the barium swallow though she declined.  She was recommended a trial of over-the-counter Pepcid for 14 days.  She was provided Zofran for her chronic intermittent nausea.  She did have evidence of the liver cyst on CT imaging.  This is likely benign however given lack of prior imaging to compare and ongoing weight loss she is planning for MRI of the abdomen for further evaluation.    MRI of the abdomen with and without contrast/28/23:Liver is normal with normal background signal.  Nonenhancing 2 cm cyst within left hepatic lobe, similar to prior.  No suspicious osseous lesions.  Postoperative changes of the lumbar spine and left pelvis.  Associated 4.8 x 1.3 x 7.2 cm nonenhancing postoperative fluid collection potentially representing chronic seroma or hematoma.  Bilateral renal cyst.  was informed of results. Barium swallow 6/27/2023:Tablet delayed at the GE junction and passed into the stomach.  There are tertiary contractions in the esophagus consistent with presbyesophagus..  No mucosal thickening or esophageal stricture.  Small hiatal hernia.  No gastroesophageal reflux. Upper GI series performed for epigastric pain with nausea 7/11/2023:We do not have report.    She was recommended to see Dr. Wu for her back pain. She has not hear back from their office. She continues to have dysphagia, nausea and throat clearing. She is on chronic opioids for leg pain. She has no appetite. She eats maybe one meal per day. Her weight has interestingly improved by 3 pounds.  4/13/2023 82-year-old female presents for follow-up after EGD.  She was last seen on 2/1/2023.  Given her dysphagia she was planned for EGD with dilation if indicated.  She has a history of unilateral recurrent inguinal hernia.  She was planned for CT scan of the abdomen/pelvis for further evaluation. CT scan of the abdomen/pelvis with contrast 3/8/2023:No discrete recurrent inguinal hernia is seen.  There is an 18 mm left hepatic dome cyst, similar to prior study.  Spleen, adrenal glands, pancreas are normal.  Cholecystectomy. EGD 3/31/2023:Performed without difficulty.  No endoscopic abnormality was evident in the esophagus to explain patient's dysphagia.  Despite this the entire esophagus was dilated with a Savary dilator to 18 mm.  Gastritis noted and biopsied. This revealed chemical/reactive gastropathy, negative for H pylori. Normal duodenum.  Repeat EGD as needed.   She has had memory issues of late. Her  has to aid in providing a history. She describes an oropharyngeal dysphagia and globus sensation in her upper esophagus. She has occasional nausea with vomiting. She has an anti-emetic at home that works well for this. She states "she never has an appetite." She was unaware of any prior liver cyst. She feels a bulge in her left inguinal area. This only bothers her when doing heavy lifting.  2/1/2023 83 y/o female with a hx of esophageal dysphagia and esophageal dilation here with dysphagia and abdominal pain. She last had an EGD in ay 2020. She was dilated up to 20mm. She also had gastric ulcers x 3.   She has had decreased appetite, food sticking, nausea, and 30 lb weight loss over the past year.